# Patient Record
Sex: MALE | Race: AMERICAN INDIAN OR ALASKA NATIVE
[De-identification: names, ages, dates, MRNs, and addresses within clinical notes are randomized per-mention and may not be internally consistent; named-entity substitution may affect disease eponyms.]

---

## 2018-02-09 ENCOUNTER — HOSPITAL ENCOUNTER (EMERGENCY)
Dept: HOSPITAL 5 - ED | Age: 43
LOS: 1 days | Discharge: HOME | End: 2018-02-10
Payer: SELF-PAY

## 2018-02-09 DIAGNOSIS — F17.200: ICD-10-CM

## 2018-02-09 DIAGNOSIS — I25.2: ICD-10-CM

## 2018-02-09 DIAGNOSIS — L02.31: Primary | ICD-10-CM

## 2018-02-09 DIAGNOSIS — I10: ICD-10-CM

## 2018-02-09 PROCEDURE — 87116 MYCOBACTERIA CULTURE: CPT

## 2018-02-09 PROCEDURE — 87186 SC STD MICRODIL/AGAR DIL: CPT

## 2018-02-09 PROCEDURE — 87076 CULTURE ANAEROBE IDENT EACH: CPT

## 2018-02-09 PROCEDURE — 99282 EMERGENCY DEPT VISIT SF MDM: CPT

## 2018-02-09 NOTE — EMERGENCY DEPARTMENT REPORT
- General


Chief complaint: Skin/Abscess/Foreign Body


Stated complaint: CYST ON BUTT


Time Seen by Provider: 18 22:27


Source: patient


Mode of arrival: Ambulatory


Limitations: No Limitations





- History of Present Illness


Initial comments: 


42-year-old male past medical history MI , hypertension, noncompliance with 

his hypertension meds presents with complaint of one week of left buttock pain.

  States he developed a boil which spontaneously erupted and has been draining 

pus.  Denies fevers or chills.  States his left buttock is uncomfortable.  

Patient awake alert and oriented 3 nontoxic-appearing.  States he has not 

taken any medicines for his hypertension in over 3 years.  Has not followed up 

with primary care either.


MD complaint: abscess/boil


Onset/Timin


-: week(s)


Location: buttocks (left buttock)


Severity: mild


Severity scale (0 -10): 5


Quality: aching


Consistency: constant


Improves with: none


Worsens with: none


Context: none


Associated symptoms: denies other symptoms


Treatments Prior to Arrival: bandages, attempted to drain pus at





- Related Data


 Home Medications











 Medication  Instructions  Recorded  Confirmed  Last Taken


 


Lisinopril [Zestril TAB] 40 mg PO QDAY 10/11/15 11/11/15 11/11/15 05:30


 


Spironolactone [Aldactone] 25 mg PO QDAY 11/06/15 11/11/15 11/10/15








 Previous Rx's











 Medication  Instructions  Recorded  Last Taken  Type


 


Docusate Sodium [Colace] 100 mg PO BID #14 capsule 11/11/15 Unknown Rx


 


oxyCODONE /ACETAMINOPHEN [Percocet 1 - 2 tab PO Q6HR PRN #30 tablet 11/11/15 

Unknown Rx





5/325]    


 


Acetaminophen [Acetaminophen TAB] 500 mg PO Q6HR PRN #20 tablet 02/10/18 

Unknown Rx


 


Carvedilol [Coreg] 3.125 mg PO BID #60 tablet 02/10/18 Unknown Rx


 


Cephalexin [Keflex] 500 mg PO Q12HR #14 cap 02/10/18 Unknown Rx


 


Lisinopril [Zestril] 20 mg PO QDAY #30 tablet 02/10/18 Unknown Rx


 


Sulfamethoxazole/Trimethoprim 1 each PO BID #14 tablet 02/10/18 Unknown Rx





[Bactrim DS TAB]    











 Allergies











Allergy/AdvReac Type Severity Reaction Status Date / Time


 


No Known Allergies Allergy   Unverified 14 15:54














Abscess Boil HPI





- HPI


Chief Complaint: Skin/Abscess/Foreign Body


Stated Complaint: CYST ON BUTT


Time Seen by Provider: 18 22:27


Home Medications: 


 Home Medications











 Medication  Instructions  Recorded  Confirmed  Last Taken


 


Lisinopril [Zestril TAB] 40 mg PO QDAY 10/11/15 11/11/15 11/11/15 05:30


 


Spironolactone [Aldactone] 25 mg PO QDAY 11/06/15 11/11/15 11/10/15








 Previous Rx's











 Medication  Instructions  Recorded  Last Taken  Type


 


Docusate Sodium [Colace] 100 mg PO BID #14 capsule 11/11/15 Unknown Rx


 


oxyCODONE /ACETAMINOPHEN [Percocet 1 - 2 tab PO Q6HR PRN #30 tablet 11/11/15 

Unknown Rx





5/325]    


 


Acetaminophen [Acetaminophen TAB] 500 mg PO Q6HR PRN #20 tablet 02/10/18 

Unknown Rx


 


Carvedilol [Coreg] 3.125 mg PO BID #60 tablet 02/10/18 Unknown Rx


 


Cephalexin [Keflex] 500 mg PO Q12HR #14 cap 02/10/18 Unknown Rx


 


Lisinopril [Zestril] 20 mg PO QDAY #30 tablet 02/10/18 Unknown Rx


 


Sulfamethoxazole/Trimethoprim 1 each PO BID #14 tablet 02/10/18 Unknown Rx





[Bactrim DS TAB]    











Allergies/Adverse Reactions: 


 Allergies











Allergy/AdvReac Type Severity Reaction Status Date / Time


 


No Known Allergies Allergy   Unverified 14 15:54














ED Review of Systems


ROS: 


Stated complaint: CYST ON BUTT


Other details as noted in HPI





Constitutional: denies: chills, fever


Eyes: denies: eye pain, eye discharge, vision change


ENT: denies: ear pain, throat pain


Respiratory: denies: cough, shortness of breath, wheezing


Cardiovascular: denies: chest pain, palpitations


Endocrine: no symptoms reported


Gastrointestinal: denies: abdominal pain, nausea, diarrhea


Genitourinary: denies: urgency, dysuria


Musculoskeletal: denies: back pain, joint swelling, arthralgia


Skin: as per HPI.  denies: rash, lesions


Neurological: denies: headache, weakness, paresthesias


Psychiatric: denies: anxiety, depression


Hematological/Lymphatic: denies: easy bleeding, easy bruising





ED Past Medical Hx





- Past Medical History


Hx Hypertension: Yes ()


Hx Heart Attack/AMI: Yes (2015)





- Social History


Smoking Status: Current Every Day Smoker


Substance Use Type: Alcohol





- Medications


Home Medications: 


 Home Medications











 Medication  Instructions  Recorded  Confirmed  Last Taken  Type


 


Lisinopril [Zestril TAB] 40 mg PO QDAY 10/11/15 11/11/15 11/11/15 05:30 History


 


Spironolactone [Aldactone] 25 mg PO QDAY 11/06/15 11/11/15 11/10/15 History


 


Docusate Sodium [Colace] 100 mg PO BID #14 capsule 11/11/15  Unknown Rx


 


oxyCODONE /ACETAMINOPHEN [Percocet 1 - 2 tab PO Q6HR PRN #30 tablet 11/11/15  

Unknown Rx





5/325]     


 


Acetaminophen [Acetaminophen TAB] 500 mg PO Q6HR PRN #20 tablet 02/10/18  

Unknown Rx


 


Carvedilol [Coreg] 3.125 mg PO BID #60 tablet 02/10/18  Unknown Rx


 


Cephalexin [Keflex] 500 mg PO Q12HR #14 cap 02/10/18  Unknown Rx


 


Lisinopril [Zestril] 20 mg PO QDAY #30 tablet 02/10/18  Unknown Rx


 


Sulfamethoxazole/Trimethoprim 1 each PO BID #14 tablet 02/10/18  Unknown Rx





[Bactrim DS TAB]     














ED Physical Exam





- General


Limitations: No Limitations


General appearance: alert, in no apparent distress





- Head


Head exam: Present: atraumatic, normocephalic





- Eye


Eye exam: Present: normal appearance, PERRL, EOMI





- ENT


ENT exam: Present: mucous membranes moist





- Neck


Neck exam: Present: normal inspection





- Respiratory


Respiratory exam: Present: normal lung sounds bilaterally.  Absent: respiratory 

distress





- Cardiovascular


Cardiovascular Exam: Present: regular rate, normal rhythm.  Absent: systolic 

murmur, diastolic murmur, rubs, gallop





- GI/Abdominal


GI/Abdominal exam: Present: soft, normal bowel sounds





- Rectal


Rectal exam: Present: deferred





- Extremities Exam


Extremities exam: Present: normal inspection





- Back Exam


Back exam: Present: normal inspection





- Neurological Exam


Neurological exam: Present: alert, oriented X3





- Psychiatric


Psychiatric exam: Present: normal affect, normal mood





- Skin


Skin exam: Present: warm, dry, intact, normal color.  Absent: rash





- Expanded Skin Exam


  ** Expanded


Type of lesion: Present: abscess


Distribution of rash: other (left buttock)


Description of rash: Present: tenderness





  __________________________














  __________________________





 1 - Draining abscess here








ED Course


 Vital Signs











  18





  19:18 22:34 23:23


 


Temperature 98.6 F  


 


Pulse Rate 74  


 


Respiratory 18 16 18





Rate   


 


Blood Pressure  193/116 


 


Blood Pressure 193/119  





[Left]   


 


O2 Sat by Pulse 100  





Oximetry   














  18





  23:38


 


Temperature 


 


Pulse Rate 60


 


Respiratory 18





Rate 


 


Blood Pressure 


 


Blood Pressure 170/116





[Left] 


 


O2 Sat by Pulse 99





Oximetry 














ED Medical Decision Making





- Medical Decision Making


A/P: Asymptomatic hypertension, medical noncompliance, left buttock abscess


1-abscess already draining


2-empiric course of Bactrim and Keflex twice a day for 7 days


3-wound culture sent


4-refill on patient's hypertension medicines.  I stressed the importance of 

follow-up with primary care and the importance of managing his blood pressure 

to mitigate any potential heart attack stroke or other complications associated 

with uncontrolled hypertension.  Patient has no current chest pain palpitations 

shortness of breath up or lower extremity paresthesias headache dizziness 

shortness of breath. 


Critical care attestation.: 


If time is entered above; I have spent that time in minutes in the direct care 

of this critically ill patient, excluding procedure time.








ED Disposition


Clinical Impression: 


 Left buttock abscess, Asymptomatic hypertension





Disposition:  TO HOME OR SELFCARE


Is pt being admited?: No


Does the pt Need Aspirin: No


Condition: Stable


Instructions:  Hypertension (ED), Abscess (ED), Sitz Bath (GEN)


Additional Instructions: 


Wound check in the ED in 48-72


Prescriptions: 


Acetaminophen [Acetaminophen TAB] 500 mg PO Q6HR PRN #20 tablet


 PRN Reason: Pain


Carvedilol [Coreg] 3.125 mg PO BID #60 tablet


Cephalexin [Keflex] 500 mg PO Q12HR #14 cap


Lisinopril [Zestril] 20 mg PO QDAY #30 tablet


Sulfamethoxazole/Trimethoprim [Bactrim DS TAB] 1 each PO BID #14 tablet


Referrals: 


[facility] [Outside] - 3-5 Days


[facility] [Outside] - 3-5 Days


Forms:  Work/School Release Form(ED)


Time of Disposition: 00:36

## 2018-02-10 VITALS — DIASTOLIC BLOOD PRESSURE: 110 MMHG | SYSTOLIC BLOOD PRESSURE: 166 MMHG
